# Patient Record
Sex: MALE | Race: WHITE | NOT HISPANIC OR LATINO | Employment: OTHER | URBAN - METROPOLITAN AREA
[De-identification: names, ages, dates, MRNs, and addresses within clinical notes are randomized per-mention and may not be internally consistent; named-entity substitution may affect disease eponyms.]

---

## 2017-03-08 ENCOUNTER — OFFICE VISIT (OUTPATIENT)
Dept: NEUROSURGERY | Facility: CLINIC | Age: 72
End: 2017-03-08

## 2017-03-08 VITALS
HEART RATE: 64 BPM | BODY MASS INDEX: 25.36 KG/M2 | DIASTOLIC BLOOD PRESSURE: 65 MMHG | SYSTOLIC BLOOD PRESSURE: 109 MMHG | WEIGHT: 204 LBS | HEIGHT: 75 IN

## 2017-03-08 DIAGNOSIS — C71.1 MALIGNANT NEOPLASM OF FRONTAL LOBE (HCC): Primary | ICD-10-CM

## 2017-03-08 PROCEDURE — 99213 OFFICE O/P EST LOW 20 MIN: CPT | Performed by: NEUROLOGICAL SURGERY

## 2017-03-08 RX ORDER — CYANOCOBALAMIN (VITAMIN B-12) 500 MCG
400 LOZENGE ORAL
COMMUNITY

## 2017-03-08 RX ORDER — HONEY 100 %
PASTE (ML) TOPICAL
Refills: 0 | COMMUNITY
Start: 2017-01-09

## 2017-03-08 RX ORDER — CETIRIZINE HYDROCHLORIDE 10 MG/1
10 TABLET ORAL DAILY
COMMUNITY

## 2017-03-08 RX ORDER — ALLOPURINOL 300 MG/1
TABLET ORAL
COMMUNITY
Start: 2012-10-19

## 2017-03-08 RX ORDER — METOPROLOL SUCCINATE 25 MG/1
TABLET, EXTENDED RELEASE ORAL
COMMUNITY
Start: 2015-04-20

## 2017-03-08 RX ORDER — DOCOSANOL 100 MG/G
CREAM TOPICAL
COMMUNITY

## 2017-03-08 RX ORDER — LISINOPRIL 5 MG/1
2.5 TABLET ORAL
COMMUNITY

## 2017-03-08 RX ORDER — BLOOD-GLUCOSE METER
EACH MISCELLANEOUS
COMMUNITY
Start: 2016-10-26

## 2017-03-08 RX ORDER — ATORVASTATIN CALCIUM 10 MG/1
10 TABLET, FILM COATED ORAL
COMMUNITY

## 2017-03-08 RX ORDER — SPIRONOLACTONE 25 MG/1
TABLET ORAL
COMMUNITY
Start: 2012-10-19

## 2017-03-08 RX ORDER — MOMETASONE FUROATE 50 UG/1
SPRAY, METERED NASAL
COMMUNITY
Start: 2012-10-19

## 2017-03-08 RX ORDER — GLIPIZIDE 10 MG/1
TABLET, FILM COATED, EXTENDED RELEASE ORAL
COMMUNITY
Start: 2012-10-17

## 2017-03-08 RX ORDER — CALCIUM POLYCARBOPHIL 625 MG 625 MG/1
TABLET ORAL
COMMUNITY
Start: 2012-10-19

## 2017-03-08 RX ORDER — PANTOPRAZOLE SODIUM 40 MG/1
40 TABLET, DELAYED RELEASE ORAL
COMMUNITY
Start: 2014-11-08

## 2017-03-08 RX ORDER — AMOXICILLIN 500 MG/1
500 TABLET, FILM COATED ORAL
COMMUNITY

## 2017-03-08 RX ORDER — FLUOROURACIL 50 MG/G
CREAM TOPICAL
COMMUNITY

## 2017-03-08 RX ORDER — LEVOTHYROXINE SODIUM 0.07 MG/1
75 TABLET ORAL
COMMUNITY

## 2017-03-08 RX ORDER — TAZAROTENE 1 MG/G
GEL TOPICAL NIGHTLY
COMMUNITY

## 2017-03-08 RX ORDER — HYDROCODONE BITARTRATE AND ACETAMINOPHEN 5; 325 MG/1; MG/1
TABLET ORAL
Refills: 0 | COMMUNITY
Start: 2017-01-06

## 2017-03-08 RX ORDER — FUROSEMIDE 40 MG/1
40 TABLET ORAL DAILY
COMMUNITY

## 2017-03-08 NOTE — PROGRESS NOTES
Subjective   Patient ID: Roger Blake is a 71 y.o. male is here today for one year follow-up of brain tumor with new MRI of the brain.    The patient's original surgery to resect the brain tumor was in 2004 and again in 2008, with an additional cranioplasty due to infected bone flap.    Today, the patient notes that he has been stable.  The patient denies any headaches, dizziness, seizures, fainting or black out spells.    He notes that he has continued to have some issues.  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 Shelby.  He notes that he is dealing with an infection in his skull and is following with Dr. Constantino, dermatology.    Brain Tumor   This is a chronic problem. The current episode started more than 1 year ago. The problem has been resolved. Pertinent negatives include no chest pain or fever.       The following portions of the patient's history were reviewed and updated as appropriate: allergies, current medications, past family history, past medical history, past social history, past surgical history and problem list.    Review of Systems   Constitutional: Negative for fever.   Cardiovascular: Negative for chest pain.   Skin: Positive for wound.   Neurological: Negative for syncope.   All other systems reviewed and are negative.    It has been about 13 years since his original surgery, about 9 years since his repeat surgery.  He had a grade 3 anaplastic oligodendroglioma resected.  He got radiation.  His current MRI was reviewed.  There is no evidence of recurrence.  He's undergone a surgery for his scalp recently.  There was some infection but also evidence of sarcoma.  He is being treated by the plastic surgeon in the dermatologist as well as by radiation oncology.  From my standpoint, the left frontal region looks just fine with no evidence of recurrence.  We have been imaging him yearly.  We can stretch it out to 18 months.    Objective   Physical Exam   Constitutional:  He is oriented to person, place, and time. He appears well-developed and well-nourished.   HENT:   Head: Normocephalic and atraumatic.   Eyes: Conjunctivae and EOM are normal. Pupils are equal, round, and reactive to light.   Fundoscopic exam:       The right eye shows no papilledema. The right eye shows venous pulsations.        The left eye shows no papilledema. The left eye shows venous pulsations.   Neck: Carotid bruit is not present.   Neurological: He is oriented to person, place, and time. He has a normal Finger-Nose-Finger Test and a normal Heel to Shin Test. Gait normal.   Reflex Scores:       Tricep reflexes are 2+ on the right side and 2+ on the left side.       Bicep reflexes are 2+ on the right side and 2+ on the left side.       Brachioradialis reflexes are 2+ on the right side and 2+ on the left side.       Patellar reflexes are 2+ on the right side and 2+ on the left side.       Achilles reflexes are 2+ on the right side and 2+ on the left side.  Psychiatric: His speech is normal.     Neurologic Exam     Mental Status   Oriented to person, place, and time.   Registration of memory: Good recent and remote memory.   Attention: normal. Concentration: normal.   Speech: speech is normal   Level of consciousness: alert  Knowledge: consistent with education.     Cranial Nerves     CN II   Visual fields full to confrontation.   Visual acuity: normal    CN III, IV, VI   Pupils are equal, round, and reactive to light.  Extraocular motions are normal.     CN V   Facial sensation intact.   Right corneal reflex: normal  Left corneal reflex: normal    CN VII   Facial expression full, symmetric.   Right facial weakness: none  Left facial weakness: none    CN VIII   Hearing: intact    CN IX, X   Palate: symmetric    CN XI   Right sternocleidomastoid strength: normal  Left sternocleidomastoid strength: normal    CN XII   Tongue: not atrophic  Tongue deviation: none    Motor Exam   Muscle bulk: normal  Right arm tone:  normal  Left arm tone: normal  Right leg tone: normal  Left leg tone: normal    Strength   Strength 5/5 except as noted.     Sensory Exam   Light touch normal.     Gait, Coordination, and Reflexes     Gait  Gait: normal    Coordination   Finger to nose coordination: normal  Heel to shin coordination: normal    Reflexes   Right brachioradialis: 2+  Left brachioradialis: 2+  Right biceps: 2+  Left biceps: 2+  Right triceps: 2+  Left triceps: 2+  Right patellar: 2+  Left patellar: 2+  Right achilles: 2+  Left achilles: 2+  Right : 2+  Left : 2+      Assessment/Plan   Independent Review of Radiographic Studies:    The recent brain MRI done 1/19/17 was reviewed.  There is no evidence of any recurrent left frontal tumor to be seen.  There is diffuse ischemic changes in the white matter and some atrophy but otherwise unremarkable.      Medical Decision Making:    Doing quite well, 13 years out from his original surgery and 9 years out from his repeat surgery.  No evidence of tumor recurrence the left frontal lobe.  We can stretch out the imaging to 18 months.  I'll see him at that point with another MRI.      Roger was seen today for brain tumor.    Diagnoses and all orders for this visit:    Malignant neoplasm of frontal lobe  -     MRI Brain With & Without Contrast; Future    Return in about 18 months (around 9/8/2018).

## 2017-08-23 ENCOUNTER — OFFICE VISIT (OUTPATIENT)
Dept: GASTROENTEROLOGY | Facility: CLINIC | Age: 72
End: 2017-08-23

## 2017-08-23 VITALS
HEIGHT: 75 IN | WEIGHT: 202 LBS | HEART RATE: 57 BPM | SYSTOLIC BLOOD PRESSURE: 113 MMHG | DIASTOLIC BLOOD PRESSURE: 54 MMHG | BODY MASS INDEX: 25.12 KG/M2

## 2017-08-23 DIAGNOSIS — Z86.010 HISTORY OF COLON POLYPS: Primary | ICD-10-CM

## 2017-08-23 PROCEDURE — 99213 OFFICE O/P EST LOW 20 MIN: CPT | Performed by: INTERNAL MEDICINE

## 2017-08-23 NOTE — PROGRESS NOTES
Subjective   Roger Blake is a 71 y.o. male is here today for follow-up.  Chief Complaint   Patient presents with   • Colonoscopy     History Polyps     History of Present Illness  I have seen the patient in the past for colon polyps and evidently his main came up on our recall list.  The full procedure report for whatever reason is not available on [facility] although there is evidence that it had been performed in 2014.  In any case, the patient has numerous medical problems, and he understands that one of them is lethal.  He has had 5 different malignancies and evidently there is enough activity with them that he's been given a limited prognosis, perhaps a couple of years.  He is having no GI complaints and is been no history of any sort of GI bleeding recently.  The following portions of the patient's history were reviewed and updated as appropriate: allergies, current medications, past family history, past medical history, past social history, past surgical history and problem list.    Review of Systems   Respiratory: Negative for shortness of breath.    Cardiovascular: Negative for chest pain.       Objective   Physical Exam   Constitutional: He appears well-developed and well-nourished.   Nursing note and vitals reviewed.        Assessment/Plan   Problems Addressed this Visit        Other    History of colon polyps - Primary        We discussed that the whole process of polyps/colorectal cancer is usually a slow one and our interventions are scheduled on a relatively leisurely based on this.  If he has a malignancy that gives him a very limited prognosis, it would not impact his care one way or the other to perform colonoscopy at this time, particularly in the absence of any GI symptoms.  We discussed this carefully and he is agreeable to hold off on any further evaluation unless his status changes.

## 2017-10-10 ENCOUNTER — HOSPITAL ENCOUNTER (OUTPATIENT)
Dept: SLEEP MEDICINE | Facility: HOSPITAL | Age: 72
Discharge: HOME | End: 2017-10-10
Admitting: INTERNAL MEDICINE

## 2017-10-10 PROCEDURE — G0463 HOSPITAL OUTPT CLINIC VISIT: HCPCS

## 2017-10-10 NOTE — PROGRESS NOTES
Roger Blake  : 1945  [id number]     DATE OF SERVICE: 10/10/2017   REASON FOR EVALUATION: Obstructive sleep apnea     HISTORY OF PRESENT ILLNESS: . Roger Blake is a 72 y.o. male and has severe obstructive sleep apnea syndrome. His polysomnography in the past has revealed apnea-hypopnea index AHI of 54.4 per sleep hour, minimum SpO2 of 89% and he had hypersomnia with Chaplin Sleepiness Scale of 11.     He is now on auto CPAP therapy at 11 cmH2O and compliance data indicates excellent compliance with 63.3% usage with an average usage of 6 hours and 26 minutes and AHI down to 10.1 and his Chaplin Sleepiness Scale score is 2. He is compliant and benefiting from it.     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  He also had radiation therapy.  Past Medical History:   Diagnosis Date   • A-fib    • Brain cancer    • Congenital anomaly of heart    • COPD (chronic obstructive pulmonary disease)    • Diabetes    • Diabetic peripheral neuropathy    • Diverticulitis of colon    • ED (erectile dysfunction)    • History of colon polyps    • HTN (hypertension)    • Hypercholesterolemia    • Non Hodgkin's lymphoma    • Stroke syndrome    • Thyroid disease      Current Outpatient Prescriptions:   •  allopurinol (ZYLOPRIM) 300 MG tablet, Take  by mouth., Disp: , Rfl:   •  amoxicillin (AMOXIL) 500 MG tablet, Take 500 mg by mouth., Disp: , Rfl: When necessary for dental work  •  apixaban (ELIQUIS) 5 MG tablet tablet, Take 5 mg by mouth., Disp: , Rfl:   •  atorvastatin (LIPITOR) 10 MG tablet, Take 10 mg by mouth., Disp: , Rfl:   •  Blood Glucose Monitoring Suppl (ACCU-CHEK CHONG SMARTVIEW) W/DEVICE kit, USE UTD, Disp: , Rfl:   •  cetirizine (zyrTEC) 10 MG tablet, Take 10 mg by mouth Daily., Disp: , Rfl:   •  Cholecalciferol (VITAMIN D) 1000 UNITS tablet, Take 1,000 Units by mouth. 2000 IU daily, Disp: , Rfl:    •  fluorouracil (EFUDEX) 5 % cream, Apply  topically., Disp: , Rfl:   •  furosemide (LASIX) 40 MG tablet, Take 40 mg by mouth Daily. 60 mg daily, Disp: , Rfl:   •  glipiZIDE (GLUCOTROL) 10 MG 24 hr tablet, Take  by mouth., Disp: , Rfl:   •  GLUCOSAMINE-CHONDROITIN PO, Take  by mouth., Disp: , Rfl:   •  IRON PO, Take  by mouth., Disp: , Rfl:   •  levothyroxine (SYNTHROID, LEVOTHROID) 75 MCG tablet, Take 75 mcg by mouth., Disp: , Rfl:   •  lisinopril (PRINIVIL,ZESTRIL) 5 MG tablet, Take 2.5 mg by mouth., Disp: , Rfl:   •  metoprolol succinate XL (TOPROL-XL) 25 MG 24 hr tablet, TAKE 0.5-1 TABLET BY MOUTH EVERY DAY, Disp: , Rfl:   •  mometasone (NASONEX) 50 MCG/ACT nasal spray, into each nostril., Disp: , Rfl:   •  Multiple Vitamin (MULTIVITAMINS PO), Take  by mouth., Disp: , Rfl:   •  mupirocin (BACTROBAN) 2 % ointment, Apply  topically 3 (Three) Times a Day., Disp: , Rfl:   •  pantoprazole (PROTONIX) 40 MG EC tablet, 40 mg., Disp: , Rfl:   •  polycarbophil (FIBERCON) 625 MG tablet, Take  by mouth., Disp: , Rfl:   •  Probiotic Product (PROBIOTIC PO), Take  by mouth., Disp: , Rfl:   •  SITagliptin (JANUVIA) 100 MG tablet, Take  by mouth., Disp: , Rfl:   •  spironolactone (ALDACTONE) 25 MG tablet, Take  by mouth., Disp: , Rfl:   •  Vitamin E 400 UNITS tablet, Take 400 mg by mouth., Disp: , Rfl:   •  Wound Dressings (MEDIHONEY WOUND/BURN DRESSING) paste, U UTD, Disp: , Rfl: 0  .  Gabapentin  .  Keflex  . Tramadol  Allergies   Allergen Reactions   • Diphenhydramine      Pt no longer allergic     • Ibuprofen      Pt no longer allergic       PHYSICAL EXAMINATION:  VITAL SIGNS: Weight 223 pounds, height 75 inches, body mass index 26, blood pressure 123/71, heart rate 64.    HEENT: Normal.   NECK: Supple. No bruits.   CARDIAC: Normal.   LUNGS: Clear to auscultation.   EXTREMITIES: No edema.     IMPRESSION: Patient with severe obstructive sleep apnea syndrome with hypersomnia successfully treated with CPAP therapy at 11 cmH2O  with resolution of respiratory events, hypoxia and hypersomnia and is compliant and benefiting from it.     RECOMMENDATIONS: Continue present CPAP. Followup in 1 year.     Luis Carlos Thomas M.D.  10/10/2017